# Patient Record
Sex: FEMALE | Race: BLACK OR AFRICAN AMERICAN | NOT HISPANIC OR LATINO | URBAN - METROPOLITAN AREA
[De-identification: names, ages, dates, MRNs, and addresses within clinical notes are randomized per-mention and may not be internally consistent; named-entity substitution may affect disease eponyms.]

---

## 2021-02-09 ENCOUNTER — APPOINTMENT (RX ONLY)
Dept: URBAN - METROPOLITAN AREA CLINIC 368 | Facility: CLINIC | Age: 21
Setting detail: DERMATOLOGY
End: 2021-02-09

## 2021-02-09 DIAGNOSIS — L91.0 HYPERTROPHIC SCAR: ICD-10-CM

## 2021-02-09 DIAGNOSIS — L81.4 OTHER MELANIN HYPERPIGMENTATION: ICD-10-CM

## 2021-02-09 PROCEDURE — ? COUNSELING

## 2021-02-09 PROCEDURE — 11900 INJECT SKIN LESIONS </W 7: CPT

## 2021-02-09 PROCEDURE — ? INTRALESIONAL KENALOG

## 2021-02-09 PROCEDURE — 99202 OFFICE O/P NEW SF 15 MIN: CPT | Mod: 25

## 2021-02-09 ASSESSMENT — LOCATION ZONE DERM: LOCATION ZONE: EAR

## 2021-02-09 ASSESSMENT — LOCATION SIMPLE DESCRIPTION DERM
LOCATION SIMPLE: RIGHT EAR
LOCATION SIMPLE: LEFT EAR

## 2021-02-09 ASSESSMENT — LOCATION DETAILED DESCRIPTION DERM
LOCATION DETAILED: LEFT ANTERIOR EARLOBE
LOCATION DETAILED: RIGHT ANTERIOR EARLOBE

## 2021-02-09 NOTE — PROCEDURE: INTRALESIONAL KENALOG
Kenalog Preparation: Kenalog
Concentration Of Solution Injected (Mg/Ml): 10.0
Total Volume Injected (Ccs- Only Use Numbers And Decimals): 0.2
Include Z78.9 (Other Specified Conditions Influencing Health Status) As An Associated Diagnosis?: No
Detail Level: Detailed
Medical Necessity Clause: This procedure was medically necessary because the lesions that were treated were:
Consent: The risks of atrophy were reviewed with the patient.
X Size Of Lesion In Cm (Optional): 0
Administered By (Optional): Priscilla Yeon PA-C

## 2021-02-11 ENCOUNTER — APPOINTMENT (RX ONLY)
Dept: URBAN - METROPOLITAN AREA CLINIC 368 | Facility: CLINIC | Age: 21
Setting detail: DERMATOLOGY
End: 2021-02-11

## 2021-02-11 DIAGNOSIS — L91.0 HYPERTROPHIC SCAR: ICD-10-CM

## 2021-02-11 PROCEDURE — ? EXCISION (NO PATHOLOGY)

## 2021-02-11 ASSESSMENT — LOCATION DETAILED DESCRIPTION DERM
LOCATION DETAILED: RIGHT POSTERIOR EARLOBE
LOCATION DETAILED: RIGHT ANTERIOR EARLOBE

## 2021-02-11 ASSESSMENT — LOCATION ZONE DERM: LOCATION ZONE: EAR

## 2021-02-11 ASSESSMENT — LOCATION SIMPLE DESCRIPTION DERM: LOCATION SIMPLE: RIGHT EAR

## 2021-02-11 NOTE — PROCEDURE: EXCISION (NO PATHOLOGY)
Secondary Defect Length (In Cm): 0
Consent was obtained from the patient. The risks and benefits to therapy were discussed in detail. Specifically, the risks of infection, scarring, bleeding, prolonged wound healing, incomplete removal, allergy to anesthesia, nerve injury and recurrence were addressed. Prior to the procedure, the treatment site was clearly identified and confirmed by the patient. All components of Universal Protocol/PAUSE Rule completed.
Bill For Surgical Tray: no
Estimated Blood Loss (Cc): minimal
Intermediate Repair Preamble Text (Leave Blank If You Do Not Want): Undermining was performed with blunt dissection.
Helical Rim Text: The closure involved the helical rim.
Slit Excision Additional Text (Leave Blank If You Do Not Want): A linear line was drawn on the skin overlying the lesion. An incision was made slowly until the lesion was visualized.  Once visualized, the lesion was removed with blunt dissection.
Dressing: dry sterile dressing
Complex Repair Preamble Text (Leave Blank If You Do Not Want): Extensive wide undermining was performed.
Medical Necessity Information: It is in your best interest to select a reason for this procedure from the list below. All of these items fulfill various CMS LCD requirements except lesion extends to a margin.
Size Of Margin In Cm: 0.1
Scalpel Size: 15 blade
Hemostasis: Electrocautery
Repair Type: Intermediate
Excisional Biopsy Additional Text (Leave Blank If You Do Not Want): The margin was drawn around the clinically apparent lesion.  An elliptical shape was then drawn on the skin incorporating the lesion and margins.  Incisions were then made along these lines to the appropriate tissue plane and the lesion was extirpated.
Retention Suture Text: Retention sutures were placed to support the closure and prevent dehiscence.
Suture Removal: 10 days
Anesthesia Type: 1% Xylocaine with [id number] epinephrine and sodium bicarbonate
Debridement Text: The wound edges were debrided prior to proceeding with the closure to facilitate wound healing.
Excision Depth: adipose tissue
Post-Care Instructions: I reviewed with the patient in detail post-care instructions. Patient is not to engage in any heavy lifting, exercise, or swimming for the next 14 days. Should the patient develop any fevers, chills, bleeding, severe pain patient will contact the office immediately.
Anesthesia Volume In Cc: 1
Use Complex Repair Preambles?: Yes
Medical Necessity Clause: This procedure was medically necessary because the lesion that was treated was:
Wound Care: Petrolatum
Additional Anesthesia Volume In Cc: 6
Excision Method: Elliptical
Fusiform Excision Additional Text (Leave Blank If You Do Not Want): The margin was drawn around the clinically apparent lesion.  A fusiform shape was then drawn on the skin incorporating the lesion and margins.  Incisions were then made along these lines to the appropriate tissue plane and the lesion was extirpated.
Anesthesia Type: 1% lidocaine with epinephrine
Intermediate / Complex Repair - Final Wound Length In Cm: 1.1
Vermilion Border Text: The closure involved the vermilion border.
Undermining Type: Entire Wound
Nostril Rim Text: The closure involved the nostril rim.
Detail Level: Detailed
Deep Sutures: 4-0 Caprosyn
Size Of Lesion In Cm: 0.4
Saucerization Excision Additional Text (Leave Blank If You Do Not Want): The margin was drawn around the clinically apparent lesion.  Incisions were then made along these lines, in a tangential fashion, to the appropriate tissue plane and the lesion was extirpated.
Anesthesia Volume In Cc: 3
Epidermal Closure: running
Size Of Lesion In Cm: 0.6
Epidermal Sutures: 6-0 Ethilon
Intermediate / Complex Repair - Final Wound Length In Cm: 1.3

## 2022-10-11 ENCOUNTER — APPOINTMENT (RX ONLY)
Dept: URBAN - METROPOLITAN AREA CLINIC 321 | Facility: CLINIC | Age: 22
Setting detail: DERMATOLOGY
End: 2022-10-11

## 2022-10-11 DIAGNOSIS — L91.0 HYPERTROPHIC SCAR: ICD-10-CM

## 2022-10-11 PROCEDURE — 99213 OFFICE O/P EST LOW 20 MIN: CPT

## 2022-10-11 PROCEDURE — ? TREATMENT REGIMEN

## 2022-10-11 PROCEDURE — ? COUNSELING

## 2022-10-11 PROCEDURE — ? DEFER

## 2022-10-11 ASSESSMENT — SCAR ASSESSEMENT OVERALL: SCAR ASSESSMENT: 3.5 (NODULAR SCAR, ERYTHEMA)

## 2022-10-11 ASSESSMENT — LOCATION SIMPLE DESCRIPTION DERM
LOCATION SIMPLE: LEFT EAR
LOCATION SIMPLE: LEFT EAR
LOCATION SIMPLE: RIGHT EAR

## 2022-10-11 ASSESSMENT — LOCATION DETAILED DESCRIPTION DERM
LOCATION DETAILED: LEFT ANTERIOR EARLOBE
LOCATION DETAILED: LEFT ANTERIOR EARLOBE
LOCATION DETAILED: RIGHT ANTERIOR EARLOBE

## 2022-10-11 ASSESSMENT — LOCATION ZONE DERM
LOCATION ZONE: EAR
LOCATION ZONE: EAR

## 2022-10-11 NOTE — PROCEDURE: DEFER
Detail Level: Detailed
Reason To Defer Override: 45 minute excision.
Size Of Lesion In Cm (Optional): 0
Introduction Text (Please End With A Colon): The following procedure was deferred: biopsy

## 2022-10-11 NOTE — PROCEDURE: TREATMENT REGIMEN
Detail Level: Generalized
Plan: Pt to purchase pressure earrings. Will come back for shave removal with ILK

## 2022-10-18 ENCOUNTER — APPOINTMENT (RX ONLY)
Dept: URBAN - METROPOLITAN AREA CLINIC 321 | Facility: CLINIC | Age: 22
Setting detail: DERMATOLOGY
End: 2022-10-18

## 2022-10-18 DIAGNOSIS — L91.0 HYPERTROPHIC SCAR: ICD-10-CM | Status: INADEQUATELY CONTROLLED

## 2022-10-18 PROCEDURE — ? COUNSELING

## 2022-10-18 PROCEDURE — ? SHAVE REMOVAL (SELF PAY)

## 2022-10-18 PROCEDURE — ? FULL BODY SKIN EXAM - DECLINED

## 2022-10-18 ASSESSMENT — LOCATION ZONE DERM: LOCATION ZONE: EAR

## 2022-10-18 ASSESSMENT — LOCATION SIMPLE DESCRIPTION DERM
LOCATION SIMPLE: RIGHT EAR
LOCATION SIMPLE: LEFT EAR

## 2022-10-18 ASSESSMENT — LOCATION DETAILED DESCRIPTION DERM
LOCATION DETAILED: LEFT ANTERIOR EARLOBE
LOCATION DETAILED: RIGHT ANTERIOR EARLOBE

## 2022-10-18 ASSESSMENT — SCAR ASSESSEMENT OVERALL: SCAR ASSESSMENT: 3.5 (NODULAR SCAR, ERYTHEMA)

## 2022-10-18 NOTE — PROCEDURE: SHAVE REMOVAL (SELF PAY)
Detail Level: Detailed
Size Of Lesion In Cm (Required): 1.6
X Size Of Lesion In Cm (Optional): 0
Biopsy Method: 15 blade
Anesthesia Type: 1% lidocaine with epinephrine
Anesthesia Volume In Cc: 2
Hemostasis: Electrodesiccation
Wound Care: Petrolatum
Lab: [id number]
Lab Facility: [id number]
Render Path Notes In Note?: No
Medical Necessity Clause: This procedure was medically necessary because the lesion that was treated was:
Consent was obtained from the patient. The risks and benefits to therapy were discussed in detail. Specifically, the risks of infection, scarring, bleeding, prolonged wound healing, incomplete removal, allergy to anesthesia, nerve injury and recurrence were addressed. Prior to the procedure, the treatment site was clearly identified and confirmed by the patient. All components of Universal Protocol/PAUSE Rule completed.
Post-Care Instructions: I reviewed with the patient in detail post-care instructions. Patient is to keep the biopsy site dry overnight, and then apply bacitracin twice daily until healed. Patient may apply hydrogen peroxide soaks to remove any crusting.
Notification Instructions: Patient will be notified of biopsy results. However, patient instructed to call the office if not contacted within 2 weeks.
Billing Type: Third-Party Bill
Size Of Lesion In Cm (Required): 1.5

## 2022-10-18 NOTE — PROCEDURE: MIPS QUALITY
Quality 130: Documentation Of Current Medications In The Medical Record: Current Medications Documented
Quality 431: Preventive Care And Screening: Unhealthy Alcohol Use - Screening: Patient not identified as an unhealthy alcohol user when screened for unhealthy alcohol use using a systematic screening method
Quality 402: Tobacco Use And Help With Quitting Among Adolescents: Patient screened for tobacco and never smoked
Detail Level: Generalized
Quality 226: Preventive Care And Screening: Tobacco Use: Screening And Cessation Intervention: Patient screened for tobacco use and is an ex/non-smoker

## 2022-11-01 ENCOUNTER — APPOINTMENT (RX ONLY)
Dept: URBAN - METROPOLITAN AREA CLINIC 321 | Facility: CLINIC | Age: 22
Setting detail: DERMATOLOGY
End: 2022-11-01

## 2022-11-01 DIAGNOSIS — L91.0 HYPERTROPHIC SCAR: ICD-10-CM | Status: INADEQUATELY CONTROLLED

## 2022-11-01 PROCEDURE — ? INTRALESIONAL KENALOG

## 2022-11-01 PROCEDURE — ? COUNSELING

## 2022-11-01 PROCEDURE — ? FULL BODY SKIN EXAM - DECLINED

## 2022-11-01 PROCEDURE — 11900 INJECT SKIN LESIONS </W 7: CPT

## 2022-11-01 ASSESSMENT — LOCATION DETAILED DESCRIPTION DERM
LOCATION DETAILED: RIGHT ANTERIOR EARLOBE
LOCATION DETAILED: LEFT ANTERIOR EARLOBE

## 2022-11-01 ASSESSMENT — LOCATION ZONE DERM: LOCATION ZONE: EAR

## 2022-11-01 ASSESSMENT — SCAR ASSESSEMENT OVERALL: SCAR ASSESSMENT: 1.5 (FLAT SCAR, ERYTHEMA ONLY)

## 2022-11-01 ASSESSMENT — LOCATION SIMPLE DESCRIPTION DERM
LOCATION SIMPLE: LEFT EAR
LOCATION SIMPLE: RIGHT EAR

## 2022-11-01 NOTE — PROCEDURE: INTRALESIONAL KENALOG
Validate Note Data When Using Inventory: Yes
Detail Level: Detailed
Medical Necessity Clause: This procedure was medically necessary because the lesions that were treated were:
How Many Mls Were Removed From The 40 Mg/Ml (10ml) Vial When Preparing The Injectable Solution?: 0
Treatment Number (Optional): 1
Include Z78.9 (Other Specified Conditions Influencing Health Status) As An Associated Diagnosis?: No
Total Volume Injected (Ccs- Only Use Numbers And Decimals): 2.0
Which Kenalog Vial Was Used?: Kenalog 40 mg/ml (10 ml vial)
Kenalog Preparation: Kenalog with 1% lidocaine with epinephrine
Concentration Of Solution Injected (Mg/Ml): 20.0
Consent: The risks of atrophy were reviewed with the patient.

## 2022-11-01 NOTE — PROCEDURE: MIPS QUALITY
Quality 226: Preventive Care And Screening: Tobacco Use: Screening And Cessation Intervention: Patient screened for tobacco use and is an ex/non-smoker
Quality 130: Documentation Of Current Medications In The Medical Record: Current Medications Documented
Quality 402: Tobacco Use And Help With Quitting Among Adolescents: Patient screened for tobacco and never smoked
Detail Level: Generalized
Quality 431: Preventive Care And Screening: Unhealthy Alcohol Use - Screening: Patient not identified as an unhealthy alcohol user when screened for unhealthy alcohol use using a systematic screening method